# Patient Record
Sex: MALE | Race: WHITE
[De-identification: names, ages, dates, MRNs, and addresses within clinical notes are randomized per-mention and may not be internally consistent; named-entity substitution may affect disease eponyms.]

---

## 2017-09-07 ENCOUNTER — HOSPITAL ENCOUNTER (EMERGENCY)
Dept: HOSPITAL 59 - ER | Age: 62
Discharge: HOME | End: 2017-09-07
Payer: COMMERCIAL
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
## 2017-09-07 DIAGNOSIS — S39.012A: Primary | ICD-10-CM
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
## 2017-09-07 LAB
ANION GAP SERPL CALC-SCNC: 10.2 MMOL/L (ref 7–16)
APPEARANCE UR: CLEAR
BACTERIA #/AREA URNS HPF: (no result) /[HPF]
BASOPHILS NFR BLD: 0.4 % (ref 0–6)
BILIRUB UR-MCNC: NEGATIVE MG/DL
BUN SERPL-MCNC: 20 MG/DL (ref 9–20)
CO2 SERPL-SCNC: 26.8 MMOL/L (ref 22–30)
COLOR UR: YELLOW
CREAT SERPL-MCNC: 1 MG/DL (ref 0.66–1.25)
EOSINOPHIL NFR BLD: 1.6 % (ref 0–6)
EPI CELLS #/AREA URNS HPF: (no result) /[HPF]
ERYTHROCYTE [DISTWIDTH] IN BLOOD BY AUTOMATED COUNT: 14.7 % (ref 11.5–14.5)
EST GLOMERULAR FILTRATION RATE: > 60 ML/MIN
GLUCOSE SERPL-MCNC: 108 MG/DL (ref 70–110)
GLUCOSE UR STRIP-MCNC: NEGATIVE MG/DL
GRANULOCYTES NFR BLD: 61.2 % (ref 47–80)
HCT VFR BLD CALC: 47.4 % (ref 42–52)
HGB BLD-MCNC: 16 GM/DL (ref 14–18)
KETONES UR QL STRIP: NEGATIVE
LYMPHOCYTES NFR BLD AUTO: 28.5 % (ref 16–45)
MCH RBC QN AUTO: 30.1 PG (ref 27–33)
MCHC RBC AUTO-ENTMCNC: 33.8 G/DL (ref 32–36)
MCV RBC AUTO: 89.3 FL (ref 81–97)
MONOCYTES NFR BLD: 8.3 % (ref 0–9)
NITRITE UR QL STRIP: NEGATIVE
PLATELET # BLD: 181 K/UL (ref 130–400)
PMV BLD AUTO: 9.9 FL (ref 7.4–10.4)
PROT UR QL STRIP: NEGATIVE
RBC # BLD AUTO: 5.31 M/UL (ref 4.4–5.7)
RBC # UR STRIP: (no result) /UL
URINE LEUKOCYTE ESTERASE: NEGATIVE
UROBILINOGEN UR STRIP-ACNC: 0.2 E.U./DL (ref 0.2–1)
WBC # BLD AUTO: 5.1 K/UL (ref 4.2–12.2)
WBC #/AREA URNS HPF: (no result) /[HPF]

## 2017-09-07 PROCEDURE — 85025 COMPLETE CBC W/AUTO DIFF WBC: CPT

## 2017-09-07 PROCEDURE — 80048 BASIC METABOLIC PNL TOTAL CA: CPT

## 2017-09-07 PROCEDURE — 99283 EMERGENCY DEPT VISIT LOW MDM: CPT

## 2017-09-07 PROCEDURE — 72110 X-RAY EXAM L-2 SPINE 4/>VWS: CPT

## 2017-09-07 PROCEDURE — 99284 EMERGENCY DEPT VISIT MOD MDM: CPT

## 2017-09-07 PROCEDURE — 81001 URINALYSIS AUTO W/SCOPE: CPT

## 2017-09-07 PROCEDURE — 96372 THER/PROPH/DIAG INJ SC/IM: CPT

## 2017-09-07 NOTE — EMERGENCY DEPARTMENT RECORD
History of Present Illness





- General


Chief Complaint: Back Pain/Injury


Stated Complaint: BACK PAIN


Time Seen by Provider: 17 13:01


Source: Patient


Mode of Arrival: Ambulatory


Limitations: No limitations





- History of Present Illness


Initial Comments: 


The patient is here due to worsening of his chronic back pain for the last 2 

days. The patient has a long hx of back issues and has had a Lami in . He 

usually only has mild pain but 2 days ago was gardening and bending a lot and 

since has developed worse pain in the R lower back. The patient does feels 

spasms at times and the pain is much worse with movement and bending and 

twisting. The pain does intermittently radiate down the buttocks to the lateral 

R thigh. The patient denies any leg numbness, weakness, bowel or bladder 

incontinence or inability to urinate, or any fever, chills, or IVDA. The pain 

is much improved when not moving.





MD Complaint: Back pain


Onset/Timin


-: Days(s)


Similar Symptoms Previously: No


Place: Home, Work


Radiation: None


Severity: Mild


Severity scale (1-10): 10


Quality: Aching, Sharp


Consistency: Constant


Improves With: None


Worsens With: Movement


Context: Bending, Turning/twisting, While lifting


Associated Symptoms: Denies other symptoms


Treatments Prior to Arrival: Prescription analgesics





- Related Data


 Home Medications











 Medication  Instructions  Recorded  Confirmed  Last Taken


 


Bupropion HCl [Wellbutrin Xl] 150 mg PO DAILY 17


 


Clonazepam [Klonopin] 0.5 mg PO DAILY 17








 Previous Rx's











 Medication  Instructions  Recorded


 


Cyclobenzaprine HCl [Flexeril] 10 mg PO TID PRN #20 tablet 17


 


Hydrocodone/Acetaminophen [Norco 1 - 2 each PO QID #20 tablet 17





5-325 Tablet]  











 Allergies











Allergy/AdvReac Type Severity Reaction Status Date / Time


 


morphine Allergy  ITCHING Verified 17 13:04


 


Penicillins Allergy  ITCHING Verified 17 13:04














Travel Screening





- Travel/Exposure Within Last 30 Days


Have you traveled within the last 30 days?: No





- Travel/Exposure Within Last Year


Have you traveled outside the [place] in the last year?: No





- Additonal Travel Details


Have you been exposed to anyone with a communicable illness?: No





- Travel Symptoms


Symptom Screening: None





Review of Systems


Constitutional: Denies: Chills, Fever


Eyes: Denies: Eye discharge


ENT: Denies: Congestion


Respiratory: Denies: Cough, Dyspnea





Past Medical History





- SOCIAL HISTORY


Smoking Status: Current every day smoker


Alcohol Use: Rare


Drug Use: None





- RESPIRATORY


Hx Respiratory Disorders: No





- CARDIOVASCULAR


Hx Cardio Disorders: Yes


Hx Deep Vein Thrombosis: Yes


Hx Heart Attack: Yes


Comment:: IVC filter





- NEURO


Hx Neuro Disorders: No





- GI


Hx GI Disorders: Yes


Hx of Polyps: Yes





- 


Hx Genitourinary Disorders: Yes


Hx Kidney Stones: Yes





- ENDOCRINE


Hx Endocrine Disorders: No





- MUSCULOSKELETAL


Hx Musculoskeletal Disorders: Yes





- PSYCH


Hx Psych Problems: Yes


Hx Anxiety: Yes


Hx Depression: Yes





- HEMATOLOGY/ONCOLOGY


Hx Hematology/Oncology Disorders: No





Family Medical History


Any Significant Family History?: No


Hx Diabetes: Father, Mother


Hx Heart Disease: Mother





Physical Exam





- General


General Appearance: Alert, Oriented x3, Cooperative, No acute distress





- Head


Head exam: Atraumatic, Normocephalic, Normal inspection





- Eye


Eye exam: Normal appearance, PERRL





- Neck


Neck exam: Normal inspection, Full ROM.  negative: Tenderness





- Respiratory


Respiratory exam: Normal lung sounds bilaterally.  negative: Respiratory 

distress





- Cardiovascular


Cardiovascular Exam: Regular rate, Normal rhythm, Normal heart sounds





- GI/Abdominal


GI/Abdominal exam: Soft, Normal bowel sounds.  negative: Guarding, Pulsatile 

mass, Rebound, Rigid, Tenderness





- Extremities


Extremities exam: Normal inspection, Full ROM, Normal capillary refill.  

negative: Tenderness





- Back


Back exam: Reports: Normal inspection, Paraspinal tenderness (The pain is very 

reproducible with palpation over the R lower lumbar area.).  Denies: Muscle 

spasm, Vertebral tenderness





- Neurological


Neurological exam: Alert, Normal gait, Reflexes normal.  negative: Abnormal gait

, Altered, Motor sensory deficit





- Skin


Skin exam: negative: Rash





Course





 Vital Signs











  17





  12:50


 


Temperature 98.1 F


 


Pulse Rate 76


 


Respiratory 16





Rate 


 


Blood Pressure 150/91


 


Pulse Ox 96














- Reevaluation(s)


Reevaluation #1: 


The patient is doing better but still does have pain. He does have an 

appointment with his PCP next week. He is to rest at home and take his pain 

medicines as directed and return to the ER if worse.


17 14:32





Reevaluation #2: 


The patient is doing a little better at this time. He is up walking with 

minimal pain but no limping. I did explain to him the need for F/U with his PCP 

and to have the back and urine rechecked.


17 14:43








Medical Decision Making





- Data Complexity


MDM Data: Labs Ordered and/or Reviewed, X-Ray Ordered and/or Reviewed





- Lab Data


Result diagrams: 


 17 13:25





 17 13:25





- Radiology Data


Radiology results: Report reviewed (LS Spine: multilevel DJD with arthritic 

changes. No acute changes per Rad.)





Disposition


Disposition: Discharge


Clinical Impression: 


Lumbar strain


Qualifiers:


 Encounter type: initial encounter Qualified Code(s): S39.012A - Strain of 

muscle, fascia and tendon of lower back, initial encounter





Disposition: Home, Self-Care


Condition: (1) Good


Instructions:  Low Back Strain (ED)


Additional Instructions: 


Please take the Norco and Flexeril as directed. Please see your PCP next week 

for recheck and also to have your urine rechecked due to the small amount of 

blood in it. Please return to the ER for any increased pain, leg weakness, 

numbness, or any bowel or bladder incontinence.


Prescriptions: 


Cyclobenzaprine HCl [Flexeril] 10 mg PO TID PRN #20 tablet


 PRN Reason: Pain


Hydrocodone/Acetaminophen [Norco 5-325 Tablet] 1 - 2 each PO QID #20 tablet


Forms:  Patient Portal Access


Time of Disposition: 14:46





Quality





- Quality Measures


Quality Measures: N/A





- Blood Pressure Screening


View Details: Yes


Does Patient Have Any of the Following: No


Blood Pressure Classification: Pre-Hypertensive BP Reading


Systolic Measurement: 139


Diastolic Measurement: 83


Screening for High Blood Pressure: < Pre-Hypertensive BP, F/U Documented > [

]


Pre-Hypertensive Follow-up Interventions: Referral to alternative/primary care 

provider.

## 2017-09-08 NOTE — RADIOLOGY REPORT
DATE:  09/07/2017 at 1:41 p.m.



EXAM:  LUMBAR SPINE.



HISTORY:   Right-sided lower back pain radiating into right leg at times.  
Injured back after bending down and lifting mulch bags.



TECHNIQUE:  Five views of the lumbar spine.



COMPARISON:  None.



FINDINGS:  There is a mild lumbar curve convex to the left.  There is narrowing
, particularly of the lower two lumbar interspaces, less so elsewhere.  
Hypertrophic spurring throughout the lumbar spine as well as the visualized 
lower thoracic spine.  The L4-5 interspace is probably partially fused.  Mild 
posterior subluxation of L3 on L4 is likely on a degenerative basis with facet 
joint arthropathy evident in the lumbar spine.  No definite acute fracture of 
the lumbar spine identified.  Numerous calcifications seen in the left kidney 
likely represent nonobstructing intrarenal calculi currently.  IVC filter 
overlying the right side of the mid lumbar spine.  There is probably some 
prominent bony density along the lateral aspect of the right acetabulum 
superiorly.  



IMPRESSION:  

1.  DIFFUSE DEGENERATIVE CHANGES IN THE LUMBAR SPINE.

2.  PARTIAL FUSION OF THE L4-5 INTERSPACE.

3.  MILD POSTERIOR SUBLUXATION OF L3 ON L4 APPEARS TO BE ON A DEGENERATIVE 
BASIS.

4.  IVC FILTER IN PLACE.

5.  MULTIPLE LEFT RENAL CALCULI ARE LIKELY PRESENT.

6.  PROMINENT BONY DENSITY ALONG THE LATERAL ASPECT OF THE RIGHT ACETABULUM 
SUPERIORLY MAY BE DUE TO OLD INJURY, AND CLINICAL CORRELATION IS SUGGESTED.



JOB NUMBER:  [id number]
[facility]

## 2018-06-15 ENCOUNTER — HOSPITAL ENCOUNTER (EMERGENCY)
Dept: HOSPITAL 59 - ER | Age: 63
Discharge: HOME | End: 2018-06-15
Payer: COMMERCIAL

## 2018-06-15 DIAGNOSIS — Z86.718: ICD-10-CM

## 2018-06-15 DIAGNOSIS — L03.113: Primary | ICD-10-CM

## 2018-06-15 DIAGNOSIS — F17.210: ICD-10-CM

## 2018-06-15 PROCEDURE — 99283 EMERGENCY DEPT VISIT LOW MDM: CPT

## 2018-06-15 NOTE — EMERGENCY DEPARTMENT RECORD
History of Present Illness





- General


Chief complaint: Edema


Stated complaint: SWELLING ON RT HAND


Time Seen by Provider: 06/15/18 13:38


Source: Patient


Mode of Arrival: Ambulatory


Limitations: No limitations





- History of Present Illness


Initial comments: 





62 yo male presents with RUE swelling.  He had a colonscopy at [place] first of 

the week.  Gradually throughout the week he has noted swelling first at the 

hand then forearm.  No fevers.  The area itching.  No changes in ROM.  No new 

weakness.  No history of DVT in the past.  PCP is in [place].  20 years ago he had 

a DVT after a 20 day ICU stay for accidental overdose.


MD Complaint: Extremity swelling


Onset/Timin


-: Days(s)


Location: Right, Hand


Severity scale (1-10): 3


Quality: Other


Consistency: Constant


Improves with: Nothing


Worsens with: Exertion


Associated Symptoms: Other





- Related Data


 Home Medications











 Medication  Instructions  Recorded  Confirmed  Last Taken


 


Loratadine/Pseudoephedrine 1 tab PO DAILY 06/15/18 06/15/18 Unknown





[Claritin-D 24 Hour Tablet]    








 Previous Rx's











 Medication  Instructions  Recorded


 


Clindamycin HCl 300 mg PO QID #28 capsule 06/15/18











 Allergies











Allergy/AdvReac Type Severity Reaction Status Date / Time


 


morphine Allergy  ITCHING Unverified 18 09:16


 


Penicillins Allergy  ITCHING Unverified 18 09:16














Travel Screening





- Travel/Exposure Within Last 30 Days


Have you traveled within the last 30 days?: No





Review of Systems


Constitutional: Denies: Chills, Fever, Weakness


Eyes: Denies: Eye discharge


ENT: Denies: Congestion, Throat pain


Respiratory: Denies: Cough


Cardiovascular: Denies: Chest pain, Palpitations, Syncope


Endocrine: Denies: Fatigue


Gastrointestinal: Denies: Abdominal pain, Diarrhea, Nausea, Vomiting


Genitourinary: Denies: Dysuria, Frequency, Hematuria


Musculoskeletal: Denies: Arthralgia, Back pain, Joint swelling, Myalgia


Skin: Reports: As per HPI, Bruising, Change in color


Neurological: Denies: Headache, Numbness, Weakness


Psychiatric: Denies: Anxiety


Hematological/Lymphatic: Denies: Easy bleeding, Easy bruising, Swollen glands





Past Medical History





- SOCIAL HISTORY


Smoking Status: Current every day smoker


Alcohol Use: None


Drug Use: None





- RESPIRATORY


Hx Respiratory Disorders: No





- CARDIOVASCULAR


Hx Cardio Disorders: Yes


Hx Deep Vein Thrombosis: Yes


Hx Heart Attack: Yes


Comment:: IVC filter





- NEURO


Hx Neuro Disorders: No





- GI


Hx GI Disorders: Yes


Hx of Polyps: Yes





- 


Hx Genitourinary Disorders: Yes


Hx Kidney Stones: Yes





- ENDOCRINE


Hx Endocrine Disorders: No





- MUSCULOSKELETAL


Hx Musculoskeletal Disorders: Yes





- PSYCH


Hx Psych Problems: Yes


Hx Anxiety: Yes


Hx Depression: Yes





- HEMATOLOGY/ONCOLOGY


Hx Hematology/Oncology Disorders: No





Family Medical History


Any Significant Family History?: Yes


Hx Diabetes: Father, Mother


Hx Heart Disease: Mother





Physical Exam





- General


General Appearance: Alert, Oriented x3, Cooperative, No acute distress


Limitations: No limitations





- Head


Head exam: Atraumatic, Normal inspection





- Eye


Eye exam: Normal appearance.  negative: Scleral icterus





- ENT


ENT exam: Normal exam


Ear exam: Normal external inspection


Nasal Exam: Normal inspection


Mouth exam: Normal external inspection





- Neck


Neck exam: Normal inspection





- Respiratory


Respiratory exam: Normal lung sounds bilaterally.  negative: Respiratory 

distress





- Cardiovascular


Cardiovascular Exam: Regular rate, Normal rhythm, Normal heart sounds


Peripheral Pulses: 2+: Radial (R)





- Extremities


Extremities exam: negative: Normal inspection, Tenderness


Image of Full Body: 


  __________________________














  __________________________





 1 - mild swelling and erythema of the right hand and forearm, no abnormal 

warmth, full ROM





Image of Hand: 


  __________________________














  __________________________





 1 - very slight superficial abrasion








- Neurological


Neurological exam: Alert, Oriented X3





- Psychiatric


Psychiatric exam: Normal affect, Normal mood





- Skin


Skin exam: Erythema





Course





 Vital Signs











  06/15/18





  13:14


 


Temperature 98.7 F


 


Pulse Rate 80


 


Respiratory 18





Rate 


 


Blood Pressure 129/72


 


Pulse Ox 97














- Reevaluation(s)


Reevaluation #1: 





06/15/18 13:54


Venous doppler ordered of the right hand.


06/15/18 15:05


No evidence on the doppler of DVT


He was advised on warm compresses, Keflex, and elevation


We discussed reasons to return to the ED as well.





Disposition


Disposition: Discharge


Clinical Impression: 


Cellulitis


Qualifiers:


 Site of cellulitis: unspecified site Qualified Code(s): L03.90 - Cellulitis, 

unspecified





Disposition: Home, Self-Care


Condition: (1) Good


Instructions:  Cellulitis (ED)


Additional Instructions: 


Keep the arm elevated


Warm compress to the forearm twice daily


Return or be seen if worse, fever, pain or concerns


Prescriptions: 


Clindamycin HCl 300 mg PO QID #28 capsule


Forms:  Patient Portal Access


Time of Disposition: 15:08





Quality





- Quality Measures


Quality Measures: N/A





- Blood Pressure Screening


Does Patient Have Any of the Following: No


Blood Pressure Classification: Pre-Hypertensive BP Reading


Systolic Measurement: 129


Diastolic Measurement: 72


Screening for High Blood Pressure: < Pre-Hypertensive BP, F/U Documented > [

]


Pre-Hypertensive Follow-up Interventions: Referral to alternative/primary care 

provider.

## 2018-06-16 NOTE — US VENOUS DOPPLER REPORT
DATE:  06/16/2018.



EXAM:  RIGHT UPPER EXTREMITY DUPLEX VENOUS ULTRASOUND.



HISTORY:  Pain.



TECHNIQUE:  Transverse and longitudinal sonographic images of the right upper 
extremity deep venous system.



COMPARISON:  None.



FINDINGS:  No visible areas of thrombus formation.  Normal compression and 
augmentation.  Doppler and spectral analysis with color flow is utilized.  
Normal waveforms.



IMPRESSION:  

NEGATIVE EXAMINATION.



JOB NUMBER:  [id number]
[facility]